# Patient Record
Sex: FEMALE | URBAN - METROPOLITAN AREA
[De-identification: names, ages, dates, MRNs, and addresses within clinical notes are randomized per-mention and may not be internally consistent; named-entity substitution may affect disease eponyms.]

---

## 2019-08-23 ENCOUNTER — PRE-PROCEDURE TELEPHONE (OUTPATIENT)
Dept: RADIATION ONCOLOGY | Age: 74
End: 2019-08-23

## 2019-08-23 NOTE — PROGRESS NOTES
Preparing patients chart pre-op and noted GFR was 47. I called the patient and encouraged her to please force fluids this weekend primarily water. Also informed her that we will resend the renal panel on Tuesday prior to MRI.